# Patient Record
Sex: FEMALE | Race: WHITE | NOT HISPANIC OR LATINO | Employment: FULL TIME | URBAN - METROPOLITAN AREA
[De-identification: names, ages, dates, MRNs, and addresses within clinical notes are randomized per-mention and may not be internally consistent; named-entity substitution may affect disease eponyms.]

---

## 2022-10-22 ENCOUNTER — HOSPITAL ENCOUNTER (EMERGENCY)
Facility: HOSPITAL | Age: 52
Discharge: HOME | End: 2022-10-22
Attending: EMERGENCY MEDICINE
Payer: COMMERCIAL

## 2022-10-22 VITALS
BODY MASS INDEX: 38.06 KG/M2 | TEMPERATURE: 98.1 F | RESPIRATION RATE: 18 BRPM | HEIGHT: 69 IN | OXYGEN SATURATION: 98 % | HEART RATE: 70 BPM | DIASTOLIC BLOOD PRESSURE: 96 MMHG | WEIGHT: 257 LBS | SYSTOLIC BLOOD PRESSURE: 192 MMHG

## 2022-10-22 DIAGNOSIS — I10 HYPERTENSION: Primary | ICD-10-CM

## 2022-10-22 LAB
ALBUMIN SERPL BCP-MCNC: 4.3 G/DL (ref 3.5–5)
ALP SERPL-CCNC: 80 U/L (ref 34–104)
ALT SERPL W P-5'-P-CCNC: 15 U/L (ref 7–52)
ANION GAP SERPL CALCULATED.3IONS-SCNC: 5 MMOL/L (ref 4–13)
AST SERPL W P-5'-P-CCNC: 17 U/L (ref 13–39)
BASOPHILS # BLD AUTO: 0.08 THOUSANDS/ÂΜL (ref 0–0.1)
BASOPHILS NFR BLD AUTO: 2 % (ref 0–1)
BILIRUB SERPL-MCNC: 0.92 MG/DL (ref 0.2–1)
BILIRUB UR QL STRIP: NEGATIVE
BUN SERPL-MCNC: 13 MG/DL (ref 5–25)
CALCIUM SERPL-MCNC: 9.2 MG/DL (ref 8.4–10.2)
CHLORIDE SERPL-SCNC: 101 MMOL/L (ref 96–108)
CLARITY UR: CLEAR
CO2 SERPL-SCNC: 32 MMOL/L (ref 21–32)
COLOR UR: COLORLESS
CREAT SERPL-MCNC: 0.74 MG/DL (ref 0.6–1.3)
EOSINOPHIL # BLD AUTO: 0.11 THOUSAND/ÂΜL (ref 0–0.61)
EOSINOPHIL NFR BLD AUTO: 2 % (ref 0–6)
ERYTHROCYTE [DISTWIDTH] IN BLOOD BY AUTOMATED COUNT: 13 % (ref 11.6–15.1)
GFR SERPL CREATININE-BSD FRML MDRD: 92 ML/MIN/1.73SQ M
GLUCOSE SERPL-MCNC: 90 MG/DL (ref 65–140)
GLUCOSE UR STRIP-MCNC: NEGATIVE MG/DL
HCT VFR BLD AUTO: 45.3 % (ref 34.8–46.1)
HGB BLD-MCNC: 14.4 G/DL (ref 11.5–15.4)
HGB UR QL STRIP.AUTO: NEGATIVE
IMM GRANULOCYTES # BLD AUTO: 0.01 THOUSAND/UL (ref 0–0.2)
IMM GRANULOCYTES NFR BLD AUTO: 0 % (ref 0–2)
KETONES UR STRIP-MCNC: ABNORMAL MG/DL
LEUKOCYTE ESTERASE UR QL STRIP: NEGATIVE
LYMPHOCYTES # BLD AUTO: 1.08 THOUSANDS/ÂΜL (ref 0.6–4.47)
LYMPHOCYTES NFR BLD AUTO: 24 % (ref 14–44)
MCH RBC QN AUTO: 27.2 PG (ref 26.8–34.3)
MCHC RBC AUTO-ENTMCNC: 31.8 G/DL (ref 31.4–37.4)
MCV RBC AUTO: 86 FL (ref 82–98)
MONOCYTES # BLD AUTO: 0.37 THOUSAND/ÂΜL (ref 0.17–1.22)
MONOCYTES NFR BLD AUTO: 8 % (ref 4–12)
NEUTROPHILS # BLD AUTO: 2.93 THOUSANDS/ÂΜL (ref 1.85–7.62)
NEUTS SEG NFR BLD AUTO: 64 % (ref 43–75)
NITRITE UR QL STRIP: NEGATIVE
NRBC BLD AUTO-RTO: 0 /100 WBCS
PH UR STRIP.AUTO: 7 [PH]
PLATELET # BLD AUTO: 200 THOUSANDS/UL (ref 149–390)
PMV BLD AUTO: 11.5 FL (ref 8.9–12.7)
POTASSIUM SERPL-SCNC: 3.4 MMOL/L (ref 3.5–5.3)
PROT SERPL-MCNC: 7.3 G/DL (ref 6.4–8.4)
PROT UR STRIP-MCNC: NEGATIVE MG/DL
RBC # BLD AUTO: 5.3 MILLION/UL (ref 3.81–5.12)
SODIUM SERPL-SCNC: 138 MMOL/L (ref 135–147)
SP GR UR STRIP.AUTO: 1.01 (ref 1–1.03)
UROBILINOGEN UR STRIP-ACNC: <2 MG/DL
WBC # BLD AUTO: 4.58 THOUSAND/UL (ref 4.31–10.16)

## 2022-10-22 PROCEDURE — 36415 COLL VENOUS BLD VENIPUNCTURE: CPT

## 2022-10-22 PROCEDURE — 93005 ELECTROCARDIOGRAM TRACING: CPT

## 2022-10-22 PROCEDURE — 99285 EMERGENCY DEPT VISIT HI MDM: CPT | Performed by: EMERGENCY MEDICINE

## 2022-10-22 PROCEDURE — 80053 COMPREHEN METABOLIC PANEL: CPT

## 2022-10-22 PROCEDURE — 81003 URINALYSIS AUTO W/O SCOPE: CPT

## 2022-10-22 PROCEDURE — 85025 COMPLETE CBC W/AUTO DIFF WBC: CPT

## 2022-10-22 RX ORDER — AMLODIPINE BESYLATE 5 MG/1
5 TABLET ORAL ONCE
Status: COMPLETED | OUTPATIENT
Start: 2022-10-22 | End: 2022-10-22

## 2022-10-22 RX ORDER — AMLODIPINE BESYLATE 5 MG/1
5 TABLET ORAL DAILY
Qty: 30 TABLET | Refills: 0 | Status: SHIPPED | OUTPATIENT
Start: 2022-10-22 | End: 2022-11-21

## 2022-10-22 RX ADMIN — AMLODIPINE BESYLATE 5 MG: 5 TABLET ORAL at 12:25

## 2022-10-22 NOTE — DISCHARGE INSTRUCTIONS
Please take amlodipine 1 tablet per month for 30 days  Call [facility] family Medicine group if you do not hear from them to schedule an appointment  Return sooner to the ED if you experience severe headache, dizziness or or worsening symptoms

## 2022-10-22 NOTE — ED ATTENDING ATTESTATION
10/22/2022  IPrincess Marty MD, saw and evaluated the patient  I have discussed the patient with the resident/non-physician practitioner and agree with the resident's/non-physician practitioner's findings, Plan of Care, and MDM as documented in the resident's/non-physician practitioner's note, except where noted  All available labs and Radiology studies were reviewed  I was present for key portions of any procedure(s) performed by the resident/non-physician practitioner and I was immediately available to provide assistance  At this point I agree with the current assessment done in the Emergency Department  I have conducted an independent evaluation of this patient a history and physical is as follows:    53-year-old female presenting for evaluation of asymptomatic hypertension  Patient was previously on an unknown blood pressure medication but no longer needed after a gastric bypass surgery in 2007  She was incidentally found to have blood pressure elevated to the high 318U systolic at a doctor's appointments several months ago so bought a home blood pressure cuff  States that she has continued to have blood pressures as high as 194-633 systolic at home for the last couple of months  Today she happened to see her blood pressure cuff when opening a drawer so decided to take her blood pressure again and found that her systolic blood pressure was 200  Patient denies any headache, vision changes, chest pain, difficulty breathing, or decreased urine output  She does admit to being under lot of stress at work  Physical Exam  Vitals and nursing note reviewed  Constitutional:       General: She is not in acute distress  Appearance: She is well-developed  She is not diaphoretic  HENT:      Head: Normocephalic and atraumatic  Right Ear: External ear normal       Left Ear: External ear normal       Nose: Nose normal    Eyes:      Extraocular Movements: Extraocular movements intact  Conjunctiva/sclera: Conjunctivae normal    Cardiovascular:      Rate and Rhythm: Normal rate and regular rhythm  Pulses: Normal pulses  Heart sounds: Normal heart sounds  No murmur heard  No friction rub  No gallop  Pulmonary:      Effort: Pulmonary effort is normal  No respiratory distress  Breath sounds: Normal breath sounds  No wheezing or rales  Abdominal:      General: Bowel sounds are normal  There is no distension  Palpations: Abdomen is soft  Tenderness: There is no abdominal tenderness  There is no guarding  Musculoskeletal:         General: No deformity  Normal range of motion  Cervical back: Normal range of motion and neck supple  Right lower leg: No edema  Left lower leg: No edema  Skin:     General: Skin is warm and dry  Neurological:      General: No focal deficit present  Mental Status: She is alert and oriented to person, place, and time  Motor: No abnormal muscle tone  Comments: Normal strength, normal gait, normal balance, normal speech   Psychiatric:         Mood and Affect: Mood normal            ED Course  ED Course as of 10/22/22 1706   Sat Oct 22, 2022   1151 I personally interpreted the patient's EKG which reveals normal rate, normal sinus rhythm, normal axis, normal intervals, no ischemic changes  Patient denies chest pain, no shortness of breath, no indication for troponins  CBC and CMP are unremarkable  Blood pressure is elevated here in the emergency department  Patient states that she took her blood pressure this morning simply because she happened to see her blood pressure cuff rather than for any particular symptoms  She does note that she has been sporadically taking her blood pressure since August and has noticed elevated pressures as high as the 082 systolic at home  No symptoms that would be consistent with end-organ damage    Will start on low-dose amlodipine, 5 mg, and referred to [facility] for follow-up  Patient counseled to take her blood pressure daily at the same time each day and keep a log so that blood pressure medication can be readjusted at her appointment  Of note, pt reported a mild HA in triage and to the resident physician but denied this to me  Her neuro exam was normal as above  No indication for head CT at this time       Critical Care Time  Procedures

## 2022-10-22 NOTE — ED PROVIDER NOTES
History  Chief Complaint   Patient presents with   • Hypertension     Reports checking bp @ home, SPB 200s- reports mild headache, no blurred vision, dizziness, unsteady gait chest pain, not on any antihypertensives @ home      Patient is a 29-year-old female with no significant past medical history who came into the ED complaining of  increased blood pressure  Patient stated that she took her blood pressure at home today which showed a systolic blood pressure  in the 200's  [facility] her [facility] as she was concerned    She reports that the reading at the [facility] was also high and still in the 200's  She denies  vision changes, dizziness, unsteady gait, shortness of breath or chest pain,  but reports mild headache  Of note, patient stated that back in 2005 she was put on antihypertensive medication but  after having a bypass surgery in 2007 she was taken off her antihypertensive medication and has not taken any meds since  Patient reports she has been under lot of stress at work lately, reason why she decided to take her blood pressure  Patient is seen at bedside and appear to be in no acute distress, she stated that she came to the ED because she wanted to get checked out as she was worried about  her blood pressure read this morning  None       No past medical history on file  No past surgical history on file  No family history on file  I have reviewed and agree with the history as documented  No existing history information found  No existing history information found  Review of Systems   Constitutional: Negative for chills, diaphoresis, fatigue and fever  HENT: Negative for ear pain and sore throat  Eyes: Negative for photophobia, pain and visual disturbance  Respiratory: Negative for cough and shortness of breath  Cardiovascular: Negative for chest pain, palpitations and leg swelling  Gastrointestinal: Negative for abdominal pain, nausea and vomiting  Genitourinary: Negative for dyspareunia, dysuria and hematuria  Musculoskeletal: Negative for arthralgias and back pain  Skin: Negative for color change and rash  Neurological: Positive for headaches  Negative for dizziness, seizures, syncope, facial asymmetry, speech difficulty, weakness, light-headedness and numbness  All other systems reviewed and are negative  Physical Exam  ED Triage Vitals   Temperature Pulse Respirations Blood Pressure SpO2   10/22/22 1005 10/22/22 1005 10/22/22 1005 10/22/22 1000 10/22/22 1005   98 1 °F (36 7 °C) 72 18 (!) 213/96 99 %      Temp Source Heart Rate Source Patient Position - Orthostatic VS BP Location FiO2 (%)   10/22/22 1005 10/22/22 1005 10/22/22 1000 10/22/22 1000 --   Oral Monitor Sitting Left arm       Pain Score       10/22/22 1005       1             Orthostatic Vital Signs  Vitals:    10/22/22 1000 10/22/22 1005 10/22/22 1228 10/22/22 1229   BP: (!) 213/96 (!) 177/71 (!) 192/96 (!) 192/96   Pulse:  72 70    Patient Position - Orthostatic VS: Sitting Sitting Sitting        Physical Exam  Vitals and nursing note reviewed  Constitutional:       General: She is not in acute distress  Appearance: She is well-developed  HENT:      Head: Normocephalic and atraumatic  Eyes:      Conjunctiva/sclera: Conjunctivae normal    Cardiovascular:      Rate and Rhythm: Normal rate and regular rhythm  Heart sounds: No murmur heard  Pulmonary:      Effort: Pulmonary effort is normal  No respiratory distress  Breath sounds: Normal breath sounds  Abdominal:      General: There is no distension  Palpations: Abdomen is soft  Tenderness: There is no abdominal tenderness  Musculoskeletal:      Cervical back: Neck supple  Skin:     General: Skin is warm and dry  Neurological:      Mental Status: She is alert           ED Medications  Medications   amLODIPine (NORVASC) tablet 5 mg (5 mg Oral Given 10/22/22 1225)       Diagnostic Studies  Results Reviewed     Procedure Component Value Units Date/Time    UA (URINE) with reflex to Scope [[id number]]  (Abnormal) Collected: 10/22/22 1228    Lab Status: Final result Specimen: Urine, Clean Catch Updated: 10/22/22 1237     Color, UA Colorless     Clarity, UA Clear     Specific Gravity, UA 1 010     pH, UA 7 0     Leukocytes, UA Negative     Nitrite, UA Negative     Protein, UA Negative mg/dl      Glucose, UA Negative mg/dl      Ketones, UA Trace mg/dl      Urobilinogen, UA <2 0 mg/dl      Bilirubin, UA Negative     Occult Blood, UA Negative    Comprehensive metabolic panel [[id number]]  (Abnormal) Collected: 10/22/22 1116    Lab Status: Final result Specimen: Blood from Arm, Left Updated: 10/22/22 1137     Sodium 138 mmol/L      Potassium 3 4 mmol/L      Chloride 101 mmol/L      CO2 32 mmol/L      ANION GAP 5 mmol/L      BUN 13 mg/dL      Creatinine 0 74 mg/dL      Glucose 90 mg/dL      Calcium 9 2 mg/dL      AST 17 U/L      ALT 15 U/L      Alkaline Phosphatase 80 U/L      Total Protein 7 3 g/dL      Albumin 4 3 g/dL      Total Bilirubin 0 92 mg/dL      eGFR 92 ml/min/1 73sq m     Narrative:      [place] guidelines for Chronic Kidney Disease (CKD):   •  Stage 1 with normal or high GFR (GFR > 90 mL/min/1 73 square meters)  •  Stage 2 Mild CKD (GFR = 60-89 mL/min/1 73 square meters)  •  Stage 3A Moderate CKD (GFR = 45-59 mL/min/1 73 square meters)  •  Stage 3B Moderate CKD (GFR = 30-44 mL/min/1 73 square meters)  •  Stage 4 Severe CKD (GFR = 15-29 mL/min/1 73 square meters)  •  Stage 5 End Stage CKD (GFR <15 mL/min/1 73 square meters)  Note: GFR calculation is accurate only with a steady state creatinine    CBC and differential [[id number]]  (Abnormal) Collected: 10/22/22 1116    Lab Status: Final result Specimen: Blood from Arm, Left Updated: 10/22/22 1121     WBC 4 58 Thousand/uL      RBC 5 30 Million/uL      Hemoglobin 14 4 g/dL      Hematocrit 45 3 %      MCV 86 fL      MCH 27 2 pg      MCHC 31 8 g/dL      RDW 13 0 %      MPV 11 5 fL      Platelets 606 Thousands/uL      nRBC 0 /100 WBCs      Neutrophils Relative 64 %      Immat GRANS % 0 %      Lymphocytes Relative 24 %      Monocytes Relative 8 %      Eosinophils Relative 2 %      Basophils Relative 2 %      Neutrophils Absolute 2 93 Thousands/µL      Immature Grans Absolute 0 01 Thousand/uL      Lymphocytes Absolute 1 08 Thousands/µL      Monocytes Absolute 0 37 Thousand/µL      Eosinophils Absolute 0 11 Thousand/µL      Basophils Absolute 0 08 Thousands/µL                  No orders to display         Procedures  ECG 12 Lead Documentation Only    Date/Time: 10/22/2022 12:34 PM  Performed by: Rafaela Deluna MD  Authorized by: Rafaela Deluna MD     Indications / Diagnosis:  Hypertension  ECG reviewed by me, the ED Provider: yes    Patient location:  ED  Previous ECG:     Previous ECG:  Unavailable    Comparison to cardiac monitor: Yes    Interpretation:     Interpretation: normal    Rate:     ECG rate:  66BPM    ECG rate assessment: normal    Rhythm:     Rhythm: sinus rhythm    Ectopy:     Ectopy: none    QRS:     QRS axis:  Normal  Conduction:     Conduction: normal    ST segments:     ST segments:  Normal  T waves:     T waves: normal    Comments:      Ventricular rate 66 beats per minute, WA interval 164ms QRS duration 88ms QT 440ms QTC 461ms  No acute ST segment elevation or depression, normal EKG  ED Course                                       MDM  Number of Diagnoses or Management Options  Hypertension  Diagnosis management comments: Patient presented with the complaint of increased blood pressure recorded at home with systolic blood pressure in the 200  Denies any sign of chest pain, vision problem, abdominal pain, unsteady gait weakness  Patient labs only remarkable for potassium of 3 4        There is no evidence of acute end organ damage seen based on patient labs  Patient was given amlodipine in the ED as well as a prescription of amlodipine with a referral to a PCP in a week for management of hypertension  Disposition  Final diagnoses:   Hypertension     Time reflects when diagnosis was documented in both MDM as applicable and the Disposition within this note     Time User Action Codes Description Comment    10/22/2022 12:50 PM Ravindra Silva [place] Hypertension       ED Disposition     ED Disposition   Discharge    Condition   Stable    Date/Time   Sat Oct 22, 2022 12:59 PM    Comment   Claudine Tobias discharge to home/self care  Follow-up Information     Follow up With Specialties Details Why Contact Info Additional Information    [address]  Family Medicine Call   [address] [facility] [address]  [address] [facility] , [facility] [address] [facility] [address], [address] [phone]          Discharge Medication List as of 10/22/2022  1:13 PM      START taking these medications    Details   amLODIPine (NORVASC) 5 mg tablet Take 1 tablet (5 mg total) by mouth daily, Starting Sat 10/22/2022, Until Mon 11/21/2022, Normal               PDMP Review     None           ED Provider  Attending physically available and evaluated Claudine Tobias I managed the patient along with the ED Attending      Electronically Signed by         Joeseph Halsted, MD  10/22/22 3915

## 2022-10-23 LAB
ATRIAL RATE: 66 BPM
P AXIS: 53 DEGREES
PR INTERVAL: 164 MS
QRS AXIS: 1 DEGREES
QRSD INTERVAL: 88 MS
QT INTERVAL: 440 MS
QTC INTERVAL: 461 MS
T WAVE AXIS: 28 DEGREES
VENTRICULAR RATE: 66 BPM

## 2022-10-23 PROCEDURE — 93010 ELECTROCARDIOGRAM REPORT: CPT | Performed by: INTERNAL MEDICINE

## 2022-12-14 ENCOUNTER — RA CDI HCC (OUTPATIENT)
Dept: OTHER | Facility: HOSPITAL | Age: 52
End: 2022-12-14

## 2022-12-14 NOTE — PROGRESS NOTES
[facility]       Chart reviewed, no opportunity found: CHART REVIEWED, NO OPPORTUNITY FOUND        Patients Insurance        Commercial Insurance: [address] [place] [address]

## 2022-12-20 RX ORDER — TURMERIC ROOT EXTRACT 500 MG
TABLET ORAL DAILY
COMMUNITY

## 2022-12-20 RX ORDER — LISINOPRIL 10 MG/1
10 TABLET ORAL DAILY
COMMUNITY
Start: 2022-11-04

## 2022-12-20 RX ORDER — COLLAGEN, HYDROLYSATE (BOVINE) 100 %
POWDER (GRAM) MISCELLANEOUS DAILY
COMMUNITY

## 2022-12-21 ENCOUNTER — OFFICE VISIT (OUTPATIENT)
Dept: FAMILY MEDICINE CLINIC | Facility: CLINIC | Age: 52
End: 2022-12-21

## 2022-12-21 VITALS
HEART RATE: 72 BPM | RESPIRATION RATE: 18 BRPM | SYSTOLIC BLOOD PRESSURE: 120 MMHG | OXYGEN SATURATION: 99 % | BODY MASS INDEX: 38.48 KG/M2 | HEIGHT: 69 IN | DIASTOLIC BLOOD PRESSURE: 84 MMHG | WEIGHT: 259.8 LBS

## 2022-12-21 DIAGNOSIS — B97.7 HPV (HUMAN PAPILLOMA VIRUS) INFECTION: ICD-10-CM

## 2022-12-21 DIAGNOSIS — Z00.00 ANNUAL PHYSICAL EXAM: ICD-10-CM

## 2022-12-21 DIAGNOSIS — Z98.890 S/P GASTRIC SURGERY: ICD-10-CM

## 2022-12-21 DIAGNOSIS — D50.9 IRON DEFICIENCY ANEMIA, UNSPECIFIED IRON DEFICIENCY ANEMIA TYPE: ICD-10-CM

## 2022-12-21 DIAGNOSIS — Z12.11 COLON CANCER SCREENING: ICD-10-CM

## 2022-12-21 DIAGNOSIS — Z13.29 SCREENING FOR THYROID DISORDER: ICD-10-CM

## 2022-12-21 DIAGNOSIS — Z12.4 CERVICAL CANCER SCREENING: ICD-10-CM

## 2022-12-21 DIAGNOSIS — D24.2 INTRADUCTAL PAPILLOMA OF BREAST, LEFT: ICD-10-CM

## 2022-12-21 DIAGNOSIS — Z76.89 ENCOUNTER TO ESTABLISH CARE: Primary | ICD-10-CM

## 2022-12-21 DIAGNOSIS — Z13.220 SCREENING CHOLESTEROL LEVEL: ICD-10-CM

## 2022-12-21 DIAGNOSIS — Z12.31 ENCOUNTER FOR SCREENING MAMMOGRAM FOR MALIGNANT NEOPLASM OF BREAST: ICD-10-CM

## 2022-12-21 DIAGNOSIS — I10 ESSENTIAL HYPERTENSION: ICD-10-CM

## 2022-12-21 PROBLEM — Z98.84 HISTORY OF GASTRIC BYPASS: Status: ACTIVE | Noted: 2021-06-29

## 2022-12-21 PROBLEM — Z86.018 HISTORY OF MENINGIOMA: Status: ACTIVE | Noted: 2021-06-29

## 2022-12-21 PROBLEM — Z83.3 FAMILY HISTORY OF DIABETES MELLITUS: Status: ACTIVE | Noted: 2021-06-29

## 2022-12-21 PROBLEM — Z90.49 HISTORY OF CHOLECYSTECTOMY: Status: ACTIVE | Noted: 2021-06-29

## 2022-12-21 RX ORDER — AMLODIPINE BESYLATE 10 MG/1
10 TABLET ORAL DAILY
COMMUNITY

## 2022-12-21 NOTE — PROGRESS NOTES
Assessment/Plan:   Diagnoses and all orders for this visit:    Encounter to establish care  Annual physical exam  - reviewed PMHx, PSHx, meds, allergies, FHx, Soc Hx, Sexual Hx  - UTD with Tdap (2021)   - UTD with COVID IMMs but no Booster - advised to schedule   - UTD with annual Flu vaccine   - overdue for annual GYN exam, Cervical Ca screening and Breast Ca screening - referral and script given   - due for Colon Ca screening - options d/w pt and pt interested in screening Cscope - referred to GI   - declined STD screening in the office today  - due for labs - script given   - discussed diet and exercise  - UTD with Optho and [address] [place] [address] in 1yr for annual exam or sooner if with abnml labs - pt aware and agreeable     BMI 38 0-38 9,adult  -     Ambulatory Referral to Weight Management; Future  -     Ambulatory Referral to Nutrition Services; Future  - BMI 38 4  - s/p gastric bypass in 2007   - has been doing Weight Watchers  - referral given to [facility]ist [facility]   S/P gastric surgery    Colon cancer screening  -     Ambulatory Referral to Gastroenterology; Future    Cervical cancer screening  -     Ambulatory Referral to Obstetrics / Gynecology; Future  - (+) h/o intraductal papilloma of L-breast and HPV  - strongly advised to schedule and establish with local Ob/Gyn - pt aware and agreeable   HPV (human papilloma virus) infection  Intraductal papilloma of breast, left  Encounter for screening mammogram for malignant neoplasm of breast  -     Mammo screening bilateral w 3d & cad; Future    Essential hypertension  -     Microalbumin / creatinine urine ratio  -     Basic metabolic panel;  Future  - BP stable in the office today   - currently on CCB 10mg QD and ACEI 10mg QD   - will check BMP and urine microalbumin   - UTD with COVID IMMs but no Booster   - UTD with annual Flu vaccine  - UTD with annual Optho - [facility] - got new glasses   - advised to get screening labs and RTO in 1-2wks with home BP cuff for BP check - pt aware and agreeable     Screening cholesterol level  -     Lipid panel; Future    Screening for thyroid disorder  -     TSH, 3rd generation with Free T4 reflex    Iron deficiency anemia, unspecified iron deficiency anemia type  -     Iron Panel (Includes Ferritin, Iron Sat%, Iron, and TIBC); Future    Other orders  -     lisinopril (ZESTRIL) 10 mg tablet; Take 10 mg by mouth daily  -     Collagen Hydrolysate POWD; Use daily  -     Multiple Vitamins-Minerals (One-A-Day Womens) tablet; Take by mouth daily  -     Turmeric 500 MG TABS; Take by mouth daily  -     Zinc 50 MG CAPS; Take 50 mg by mouth daily  -     amLODIPine (NORVASC) 10 mg tablet; Take 10 mg by mouth daily          Subjective:    Patient ID: Nicole Acuña is a 47 y o  female    Nicole Acuña is a 47 y o  female who presents to the office to establish care and for an annual exam  - prior PCP: [place] in [place]   - PMHx: HTN, Anemia, Intraductal papilloma of L-breast, HPV  - allergies: NKDA  - Meds: CCB 10mg QD, ACEI 10mg QD, Fish Oil   - PSHx: Meningioma s/p Craniotomy (8/2007), Gastric Bypass (7/2007), [place] (1/2008)   - FHx: M (HTN, HL, DM, CAD), F (HTN, HL, DM, Prostate Ca, kidney stones), S (HTN, kidney stones), B (DM, kidney stones, obese), MGM (Breast Ca), denies FHx of Colon Ca  - Immunizations: UTD with Tdap (2021), UTD with COVID IMMs, UTD with annual Flu vaccine   - GYN Hx: s/p Menopause, last OV was 10/2020, HPV POS, unsure of last Mammo   - Hm: has never had a Cscope   - diet/exercise: walks dogs, diet: Weight Watchers   - social: denies tob/illicits, social EtOH, works from home - works for [employer]   - sexual Hx: active with partner, declined STD screening   - last vision: wear glasses, [facility] - goes annually - got new glasses   - last dental: goes Q6months   - ROS: today in the office pt denies F/C/N/V/HA/visual changes/CP/palpitations/SOB/wheezing/abd pain/D/LE edema       The following portions of the patient's history were reviewed and updated as appropriate: allergies, current medications, past family history, past medical history, past social history, past surgical history and problem list     Review of Systems  as per HPI    Objective:  /84 (BP Location: Left arm, Patient Position: Sitting, Cuff Size: Large)   Pulse 72   Resp 18   Ht 5' 9" (1 753 m)   Wt 118 kg (259 lb 12 8 oz)   SpO2 99%   BMI 38 37 kg/m²    Physical Exam  Vitals reviewed  Constitutional:       General: She is not in acute distress  Appearance: Normal appearance  She is not ill-appearing, toxic-appearing or diaphoretic  HENT:      Head: Normocephalic and atraumatic  Right Ear: External ear normal       Left Ear: External ear normal    Eyes:      General: No scleral icterus  Right eye: No discharge  Left eye: No discharge  Extraocular Movements: Extraocular movements intact  Conjunctiva/sclera: Conjunctivae normal    Cardiovascular:      Rate and Rhythm: Normal rate and regular rhythm  Heart sounds: Normal heart sounds  Pulmonary:      Effort: Pulmonary effort is normal  No respiratory distress  Breath sounds: Normal breath sounds  No stridor  No wheezing, rhonchi or rales  Abdominal:      Palpations: Abdomen is soft  Musculoskeletal:         General: Normal range of motion  Cervical back: Normal range of motion  Right lower leg: No edema  Left lower leg: No edema  Skin:     General: Skin is warm  Neurological:      General: No focal deficit present  Mental Status: She is alert and oriented to person, place, and time  Psychiatric:         Mood and Affect: Mood normal          Behavior: Behavior normal          BMI Counseling: Body mass index is 38 37 kg/m²  The BMI is above normal  Nutrition recommendations include 3-5 servings of fruits/vegetables daily  Exercise recommendations include exercising 3-5 times per week   Patient referred to nutritionist due to patient being obese  Patient referred to weight management due to patient being obese  Depression Screening Follow-up Plan: Patient's depression screening was positive with a PHQ-2 score of 0  Their PHQ-9 score was   Clinically patient does not have depression  No treatment is required

## 2022-12-22 NOTE — PROGRESS NOTES
Assessment/Plan:   Diagnoses and all orders for this visit:    Encounter to establish care  BMI 38 0-38 9,adult  -     Ambulatory Referral to Weight Management; Future  -     Ambulatory Referral to Nutrition Services; Future  - BMI 38 4  - s/p gastric bypass in 2007   - has been doing Weight Watchers  - referral given to [facility]   S/P gastric surgery    Cervical cancer screening  -     Ambulatory Referral to Obstetrics / Gynecology; Future  - (+) h/o intraductal papilloma of L-breast and HPV  - strongly advised to schedule and establish with local Ob/Gyn - pt aware and agreeable   HPV (human papilloma virus) infection  Intraductal papilloma of breast, left  Encounter for screening mammogram for malignant neoplasm of breast  -     Mammo screening bilateral w 3d & cad; Future    Essential hypertension  -     Microalbumin / creatinine urine ratio  -     Basic metabolic panel; Future  - BP stable in the office today   - currently on CCB 10mg QD and ACEI 10mg QD   - will check BMP and urine microalbumin   - UTD with COVID IMMs but no Booster   - UTD with annual Flu vaccine  - UTD with annual Optho - [facility] - got new glasses   - advised to get screening labs and RTO in 1-2wks with home BP cuff for BP check - pt aware and agreeable     Iron deficiency anemia, unspecified iron deficiency anemia type  -     Iron Panel (Includes Ferritin, Iron Sat%, Iron, and TIBC); Future    Other orders  -     lisinopril (ZESTRIL) 10 mg tablet; Take 10 mg by mouth daily  -     Collagen Hydrolysate POWD; Use daily  -     Multiple Vitamins-Minerals (One-A-Day Womens) tablet; Take by mouth daily  -     Turmeric 500 MG TABS; Take by mouth daily  -     Zinc 50 MG CAPS; Take 50 mg by mouth daily  -     amLODIPine (NORVASC) 10 mg tablet; Take 10 mg by mouth daily          Subjective:    Patient ID: Angelika Lund is a 47 y o  female    Angelika Lund is a 47 y o  female who presents to the office to establish care and for an annual exam  - prior PCP: PA in [place]   - PMHx: HTN, Anemia, Intraductal papilloma of L-breast, HPV  - allergies: NKDA  - Meds: CCB 10mg QD, ACEI 10mg QD, Fish Oil   - PSHx: Meningioma s/p Craniotomy (8/2007), Gastric Bypass (7/2007), [place] (1/2008)   - FHx: M (HTN, HL, DM, CAD), F (HTN, HL, DM, Prostate Ca, kidney stones), S (HTN, kidney stones), B (DM, kidney stones, obese), MGM (Breast Ca), denies FHx of Colon Ca  - Immunizations: UTD with Tdap (2021), UTD with COVID IMMs, UTD with annual Flu vaccine   - GYN Hx: s/p Menopause, last OV was 10/2020, HPV POS, unsure of last Mammo   - Hm: has never had a Cscope   - diet/exercise: walks dogs, diet: Weight Watchers   - social: denies tob/illicits, social EtOH, works from home - works for [employer]   - sexual Hx: active with partner, declined STD screening   - last vision: wear glasses, [facility] - goes annually - got new glasses   - last dental: goes Q6months   - ROS: today in the office pt denies F/C/N/V/HA/visual changes/CP/palpitations/SOB/wheezing/abd pain/D/LE edema       The following portions of the patient's history were reviewed and updated as appropriate: allergies, current medications, past family history, past medical history, past social history, past surgical history and problem list     Review of Systems  as per HPI    Objective:  /84 (BP Location: Left arm, Patient Position: Sitting, Cuff Size: Large)   Pulse 72   Resp 18   Ht 5' 9" (1 753 m)   Wt 118 kg (259 lb 12 8 oz)   SpO2 99%   BMI 38 37 kg/m²    Physical Exam  Vitals reviewed  Constitutional:       General: She is not in acute distress  Appearance: Normal appearance  She is not ill-appearing, toxic-appearing or diaphoretic  HENT:      Head: Normocephalic and atraumatic  Right Ear: External ear normal       Left Ear: External ear normal    Eyes:      General: No scleral icterus  Right eye: No discharge  Left eye: No discharge  Extraocular Movements: Extraocular movements intact  Conjunctiva/sclera: Conjunctivae normal    Cardiovascular:      Rate and Rhythm: Normal rate and regular rhythm  Heart sounds: Normal heart sounds  Pulmonary:      Effort: Pulmonary effort is normal  No respiratory distress  Breath sounds: Normal breath sounds  No stridor  No wheezing, rhonchi or rales  Abdominal:      Palpations: Abdomen is soft  Musculoskeletal:         General: Normal range of motion  Cervical back: Normal range of motion  Right lower leg: No edema  Left lower leg: No edema  Skin:     General: Skin is warm  Neurological:      General: No focal deficit present  Mental Status: She is alert and oriented to person, place, and time  Psychiatric:         Mood and Affect: Mood normal          Behavior: Behavior normal          BMI Counseling: Body mass index is 38 37 kg/m²  The BMI is above normal  Nutrition recommendations include 3-5 servings of fruits/vegetables daily  Exercise recommendations include exercising 3-5 times per week  Patient referred to nutritionist due to patient being obese  Patient referred to weight management due to patient being obese  Depression Screening Follow-up Plan: Patient's depression screening was positive with a PHQ-2 score of 0  Their PHQ-9 score was   Clinically patient does not have depression  No treatment is required

## 2022-12-22 NOTE — PATIENT INSTRUCTIONS

## 2022-12-25 LAB
ALBUMIN/CREAT UR: 3 MCG/MG CREAT
BUN SERPL-MCNC: 20 MG/DL (ref 7–25)
BUN/CREAT SERPL: NORMAL (CALC) (ref 6–22)
CALCIUM SERPL-MCNC: 9.1 MG/DL (ref 8.6–10.4)
CHLORIDE SERPL-SCNC: 109 MMOL/L (ref 98–110)
CHOLEST SERPL-MCNC: 178 MG/DL
CHOLEST/HDLC SERPL: 2.7 (CALC)
CO2 SERPL-SCNC: 29 MMOL/L (ref 20–32)
CREAT SERPL-MCNC: 0.72 MG/DL (ref 0.5–1.03)
CREAT UR-MCNC: 156 MG/DL (ref 20–275)
FERRITIN SERPL-MCNC: 58 NG/ML (ref 16–232)
GFR/BSA.PRED SERPLBLD CYS-BASED-ARV: 100 ML/MIN/1.73M2
GLUCOSE SERPL-MCNC: 84 MG/DL (ref 65–99)
HDLC SERPL-MCNC: 67 MG/DL
IRON SATN MFR SERPL: 32 % (CALC) (ref 16–45)
IRON SERPL-MCNC: 94 MCG/DL (ref 45–160)
LDLC SERPL CALC-MCNC: 99 MG/DL (CALC)
MICROALBUMIN UR-MCNC: 0.4 MG/DL
NONHDLC SERPL-MCNC: 111 MG/DL (CALC)
POTASSIUM SERPL-SCNC: 4.2 MMOL/L (ref 3.5–5.3)
SODIUM SERPL-SCNC: 144 MMOL/L (ref 135–146)
TIBC SERPL-MCNC: 296 MCG/DL (CALC) (ref 250–450)
TRIGL SERPL-MCNC: 41 MG/DL
TSH SERPL-ACNC: 1.6 MIU/L

## 2023-01-10 ENCOUNTER — RA CDI HCC (OUTPATIENT)
Dept: OTHER | Facility: HOSPITAL | Age: 53
End: 2023-01-10

## 2023-01-10 NOTE — PROGRESS NOTES
[facility]  [facility] opportunities       Chart reviewed, no opportunity found: CHART REVIEWED, NO OPPORTUNITY FOUND        Patients Insurance        Commercial Insurance: [address] [place] [address]

## 2023-01-18 ENCOUNTER — OFFICE VISIT (OUTPATIENT)
Dept: FAMILY MEDICINE CLINIC | Facility: CLINIC | Age: 53
End: 2023-01-18

## 2023-01-18 VITALS
SYSTOLIC BLOOD PRESSURE: 132 MMHG | WEIGHT: 258 LBS | RESPIRATION RATE: 16 BRPM | HEART RATE: 73 BPM | DIASTOLIC BLOOD PRESSURE: 80 MMHG | OXYGEN SATURATION: 97 % | BODY MASS INDEX: 38.1 KG/M2

## 2023-01-18 DIAGNOSIS — I10 ESSENTIAL HYPERTENSION: Primary | ICD-10-CM

## 2023-01-18 DIAGNOSIS — Z12.11 COLON CANCER SCREENING: ICD-10-CM

## 2023-01-18 DIAGNOSIS — Z80.3 FAMILY HISTORY OF BREAST CANCER IN SISTER: ICD-10-CM

## 2023-01-18 DIAGNOSIS — D24.2 INTRADUCTAL PAPILLOMA OF BREAST, LEFT: ICD-10-CM

## 2023-01-18 DIAGNOSIS — Z98.890 S/P GASTRIC SURGERY: ICD-10-CM

## 2023-01-18 DIAGNOSIS — R09.81 HEAD CONGESTION: ICD-10-CM

## 2023-01-18 DIAGNOSIS — B97.7 HPV (HUMAN PAPILLOMA VIRUS) INFECTION: ICD-10-CM

## 2023-01-18 NOTE — PROGRESS NOTES
Assessment/Plan:   Diagnoses and all orders for this visit:    Essential hypertension  - BP in the office 132/88 and on my repeat 132/80 and 132/84  - per pt's cuff 136/94, HR 78  - nml renal function and urine microalbumin   - currently on CCB 10mg QD and ACEI 10mg QD and tolerating both well - cont for now  - UTD with COVID IMMs but no Booster - advised to schedule   - UTD with annual Flu vaccine  - UTD with annual Optho - [facility]   - RTO in 6months for f/u - pt aware and agreeable   - The 10-year ASCVD risk score (Cortney TAYLOR, et al , 2019) is: 1 7%    Values used to calculate the score:      Age: 47 years      Sex: Female      Is Non- : No      Diabetic: No      Tobacco smoker: No      Systolic Blood Pressure: 365 mmHg      Is BP treated: Yes      HDL Cholesterol: 67 mg/dL      Total Cholesterol: 178 mg/dL    BMI 38 0-38 9,adult  S/P gastric surgery  - s/p gastric bypass in 2007   - has been doing Weight Watchers  - has referrals for [facility]     Head congestion  - advised OTC Mucinex     HPV (human papilloma virus) infection  Intraductal papilloma of breast, left  Family history of breast cancer in sister  - (+) h/o intraductal papilloma of L-breast and HPV  - strongly advised to schedule and establish with local Ob/Gyn - pt aware and agreeable   - has Mammo scheduled for 4/1/2023    Colon cancer screening  -     Ambulatory Referral to Gastroenterology; Future          Subjective:    Patient ID: Lucy Douglas is a 47 y o  female    HPI   49yo F presents to the office for f/u  - reviewed labs done 12/24/2022  - has Mammo scheduled for 4/1/2023  - has referral to GI for screening Cscope - needs Sat appt   - BP in the office 132/88 and on my repeat 132/80 and 132/84  - per pt's cuff 136/94, HR 78  - currently on CCB 10mg QD and ACEI 10mg QD and tolerating both well   - had a migraine with aura which lasted 2wks and then had a "head cold" - (+) congestion  - did miss antihypertensives 1-2x over the past few days   - had been taking Motrin/Tylenol around the clock   - of note, sister was recently d/w Breast Ca       The following portions of the patient's history were reviewed and updated as appropriate: allergies, current medications, past family history, past medical history, past social history, past surgical history and problem list     Review of Systems  as per HPI    Objective:  /80 (BP Location: Left arm, Patient Position: Sitting, Cuff Size: Large)   Pulse 73   Resp 16   Wt 117 kg (258 lb)   SpO2 97%   BMI 38 10 kg/m²    Physical Exam  Vitals reviewed  Constitutional:       General: She is not in acute distress  Appearance: Normal appearance  She is not ill-appearing, toxic-appearing or diaphoretic  HENT:      Head: Normocephalic and atraumatic  Right Ear: External ear normal       Left Ear: External ear normal       Nose: Nose normal    Eyes:      General: No scleral icterus  Right eye: No discharge  Left eye: No discharge  Extraocular Movements: Extraocular movements intact  Conjunctiva/sclera: Conjunctivae normal    Cardiovascular:      Rate and Rhythm: Normal rate and regular rhythm  Heart sounds: Normal heart sounds  Pulmonary:      Effort: Pulmonary effort is normal  No respiratory distress  Breath sounds: Normal breath sounds  No stridor  No wheezing, rhonchi or rales  Abdominal:      Palpations: Abdomen is soft  Musculoskeletal:         General: Normal range of motion  Cervical back: Normal range of motion  Right lower leg: No edema  Left lower leg: No edema  Skin:     General: Skin is warm  Neurological:      General: No focal deficit present  Mental Status: She is alert and oriented to person, place, and time  Psychiatric:         Mood and Affect: Mood normal          Behavior: Behavior normal            BMI Counseling: Body mass index is 38 1 kg/m²   The BMI is above normal  Nutrition recommendations include 3-5 servings of fruits/vegetables daily  Exercise recommendations include exercising 3-5 times per week

## 2023-03-06 ENCOUNTER — OFFICE VISIT (OUTPATIENT)
Dept: FAMILY MEDICINE CLINIC | Facility: CLINIC | Age: 53
End: 2023-03-06

## 2023-03-06 VITALS
SYSTOLIC BLOOD PRESSURE: 120 MMHG | HEART RATE: 90 BPM | OXYGEN SATURATION: 99 % | RESPIRATION RATE: 16 BRPM | TEMPERATURE: 98.6 F | DIASTOLIC BLOOD PRESSURE: 70 MMHG | BODY MASS INDEX: 40.14 KG/M2 | WEIGHT: 271 LBS | HEIGHT: 69 IN

## 2023-03-06 DIAGNOSIS — J06.9 VIRAL UPPER RESPIRATORY TRACT INFECTION: Primary | ICD-10-CM

## 2023-03-06 NOTE — PROGRESS NOTES
Name: Matthew Gibson      : 1970      MRN: [id number]  Encounter Provider: Estrellita Bruno MD  Encounter Date: 3/6/2023   Encounter department: [facility]     1  Viral upper respiratory tract infection  Assessment & Plan:  Symptomatic care, and home COVID test is negative she is given excuse to return to work  She is afebrile             Subjective     She says since yesterday she was feeling some scratchy throat sneezing and nasal congestion, and denies any fever, she has mild cough, she had a negative COVID test, she had some family member was having symptoms, she is advised from work that she needs a note to return to work    Review of Systems   Constitutional: Negative  HENT: Positive for congestion, sneezing and sore throat  Eyes: Negative  Respiratory: Positive for cough  Gastrointestinal: Negative          Past Medical History:   Diagnosis Date   • Anemia    • Hypertension    • Obesity      Past Surgical History:   Procedure Laterality Date   • BRAIN SURGERY     • CHOLECYSTECTOMY       Family History   Problem Relation Age of Onset   • Hypertension Mother    • Hyperlipidemia Mother    • Diabetes Mother    • Coronary artery disease Mother    • Hyperlipidemia Father    • Hypertension Father    • Diabetes Father    • Prostate cancer Father 79   • Nephrolithiasis Father    • Hypertension Sister    • Nephrolithiasis Sister    • Breast cancer Sister    • Nephrolithiasis Sister    • Diabetes Brother    • Nephrolithiasis Brother    • Obesity Brother    • Breast cancer Maternal Grandmother    • Colon cancer Neg Hx      Social History     Socioeconomic History   • Marital status: Legally      Spouse name: None   • Number of children: None   • Years of education: None   • Highest education level: None   Occupational History   • None   Tobacco Use   • Smoking status: Never   • Smokeless tobacco: Never   Vaping Use   • Vaping Use: Never used Substance and Sexual Activity   • Alcohol use: Yes     Comment: social   • Drug use: Never   • Sexual activity: Yes     Partners: Male     Comment: declined STD screening in the office today   Other Topics Concern   • None   Social History Narrative   • None     Social Determinants of Health     Financial Resource Strain: Not on file   Food Insecurity: Not on file   Transportation Needs: Not on file   Physical Activity: Not on file   Stress: Not on file   Social Connections: Not on file   Intimate Partner Violence: Not on file   Housing Stability: Not on file     Current Outpatient Medications on File Prior to Visit   Medication Sig   • amLODIPine (NORVASC) 10 mg tablet Take 10 mg by mouth daily   • Collagen Hydrolysate POWD Use daily   • lisinopril (ZESTRIL) 10 mg tablet Take 10 mg by mouth daily   • Multiple Vitamins-Minerals (One-A-Day Womens) tablet Take by mouth daily   • Turmeric 500 MG TABS Take by mouth daily   • Zinc 50 MG CAPS Take 50 mg by mouth daily   • amLODIPine (NORVASC) 5 mg tablet Take 1 tablet (5 mg total) by mouth daily (Patient taking differently: Take 10 mg by mouth daily)     No Known Allergies  Immunization History   Administered Date(s) Administered   • COVID-19 PFIZER VACCINE 0 3 ML IM 03/12/2021, 04/07/2021   • INFLUENZA 10/28/2022   • Tdap 06/29/2021       Objective     /70   Pulse 90   Temp 98 6 °F (37 °C)   Resp 16   Ht 5' 9" (1 753 m)   Wt 123 kg (271 lb)   SpO2 99%   BMI 40 02 kg/m²     Physical Exam  Vitals and nursing note reviewed  Constitutional:       Appearance: She is well-developed  She is not ill-appearing  HENT:      Head: Normocephalic and atraumatic  Eyes:      General: No scleral icterus  Extraocular Movements: Extraocular movements intact  Conjunctiva/sclera: Conjunctivae normal    Neck:      Thyroid: No thyromegaly  Cardiovascular:      Rate and Rhythm: Normal rate and regular rhythm  Heart sounds: Normal heart sounds   No murmur heard   Pulmonary:      Effort: Pulmonary effort is normal       Breath sounds: Normal breath sounds  No wheezing or rales  Musculoskeletal:      Cervical back: Normal range of motion and neck supple  Lymphadenopathy:      Cervical: No cervical adenopathy  Skin:     Findings: No erythema or rash  Neurological:      Mental Status: She is alert         Jaydon Valente MD

## 2023-03-06 NOTE — ASSESSMENT & PLAN NOTE
Symptomatic care, and home COVID test is negative she is given excuse to return to work  She is afebrile

## 2023-05-05 PROBLEM — J06.9 VIRAL UPPER RESPIRATORY TRACT INFECTION: Status: RESOLVED | Noted: 2023-03-06 | Resolved: 2023-05-05

## 2023-06-02 ENCOUNTER — VBI (OUTPATIENT)
Dept: ADMINISTRATIVE | Facility: OTHER | Age: 53
End: 2023-06-02

## 2023-06-12 ENCOUNTER — TELEPHONE (OUTPATIENT)
Dept: OTHER | Facility: OTHER | Age: 53
End: 2023-06-12

## 2023-06-12 NOTE — TELEPHONE ENCOUNTER
PT's  called in requesting a call back to schedule a colonoscopy for PT, who has a referral from her PCP  Adjacent Tissue Transfer Text: The defect edges were debeveled with a #15 scalpel blade.  Given the location of the defect and the proximity to free margins an adjacent tissue transfer was deemed most appropriate.  Using a sterile surgical marker, an appropriate flap was drawn incorporating the defect and placing the expected incisions within the relaxed skin tension lines where possible.    The area thus outlined was incised deep to adipose tissue with a #15 scalpel blade.  The skin margins were undermined to an appropriate distance in all directions utilizing iris scissors.

## 2023-06-13 NOTE — TELEPHONE ENCOUNTER
Pts  called in to schedule for a colonoscopy  Passed the OA screening but requested to be scheduled on a Saturday with Dr Jethro Sparks

## 2023-06-14 ENCOUNTER — PREP FOR PROCEDURE (OUTPATIENT)
Dept: GASTROENTEROLOGY | Facility: CLINIC | Age: 53
End: 2023-06-14

## 2023-06-14 DIAGNOSIS — Z12.11 SCREENING FOR COLON CANCER: Primary | ICD-10-CM

## 2023-06-14 NOTE — TELEPHONE ENCOUNTER
Called Leonila Lowers patients spouse and scheduled - reviewed and My charted prep Instructions  Scheduled date of colonoscopy (as of today):8/12/23  Physician performing colonoscopy:Conchita  Location of colonoscopy:[facility]  Bowel prep reviewed with patient:Dulco/Miralax  Instructions reviewed with patient by:Xavi mendes  Clearances:  none

## 2023-07-13 ENCOUNTER — RA CDI HCC (OUTPATIENT)
Dept: OTHER | Facility: HOSPITAL | Age: 53
End: 2023-07-13

## 2023-07-13 NOTE — PROGRESS NOTES
[address] coding opportunities       Chart Reviewed number of suggestions sent to Provider: 1     Patients Insurance     Commercial Insurance: Bridger Saleem       E66.01: Morbid (severe) obesity due to excess calories ([address])     Per CMS/ICD 10 coding guidelines, to be used when BMI >=40

## 2023-07-19 ENCOUNTER — OFFICE VISIT (OUTPATIENT)
Dept: FAMILY MEDICINE CLINIC | Facility: CLINIC | Age: 53
End: 2023-07-19
Payer: COMMERCIAL

## 2023-07-19 VITALS
BODY MASS INDEX: 41.47 KG/M2 | RESPIRATION RATE: 16 BRPM | HEART RATE: 66 BPM | DIASTOLIC BLOOD PRESSURE: 82 MMHG | SYSTOLIC BLOOD PRESSURE: 136 MMHG | OXYGEN SATURATION: 98 % | HEIGHT: 69 IN | WEIGHT: 280 LBS

## 2023-07-19 DIAGNOSIS — Z98.890 S/P GASTRIC SURGERY: ICD-10-CM

## 2023-07-19 DIAGNOSIS — Z12.31 ENCOUNTER FOR SCREENING MAMMOGRAM FOR MALIGNANT NEOPLASM OF BREAST: ICD-10-CM

## 2023-07-19 DIAGNOSIS — D50.9 IRON DEFICIENCY ANEMIA, UNSPECIFIED IRON DEFICIENCY ANEMIA TYPE: ICD-10-CM

## 2023-07-19 DIAGNOSIS — Z13.29 SCREENING FOR THYROID DISORDER: ICD-10-CM

## 2023-07-19 DIAGNOSIS — Z13.220 SCREENING CHOLESTEROL LEVEL: ICD-10-CM

## 2023-07-19 DIAGNOSIS — Z80.3 FAMILY HISTORY OF BREAST CANCER IN SISTER: ICD-10-CM

## 2023-07-19 DIAGNOSIS — B97.7 HPV (HUMAN PAPILLOMA VIRUS) INFECTION: ICD-10-CM

## 2023-07-19 DIAGNOSIS — I10 ESSENTIAL HYPERTENSION: ICD-10-CM

## 2023-07-19 DIAGNOSIS — Z13.1 SCREENING FOR DIABETES MELLITUS: ICD-10-CM

## 2023-07-19 DIAGNOSIS — Z12.11 COLON CANCER SCREENING: ICD-10-CM

## 2023-07-19 DIAGNOSIS — R09.82 PND (POST-NASAL DRIP): Primary | ICD-10-CM

## 2023-07-19 DIAGNOSIS — D24.2 INTRADUCTAL PAPILLOMA OF BREAST, LEFT: ICD-10-CM

## 2023-07-19 PROCEDURE — 99214 OFFICE O/P EST MOD 30 MIN: CPT | Performed by: FAMILY MEDICINE

## 2023-07-19 RX ORDER — AMLODIPINE BESYLATE 10 MG/1
10 TABLET ORAL DAILY
Qty: 90 TABLET | Refills: 1 | Status: SHIPPED | OUTPATIENT
Start: 2023-07-19

## 2023-07-19 RX ORDER — FLUTICASONE PROPIONATE 50 MCG
1 SPRAY, SUSPENSION (ML) NASAL DAILY
Qty: 11.1 ML | Refills: 2 | Status: SHIPPED | OUTPATIENT
Start: 2023-07-19

## 2023-07-19 RX ORDER — LISINOPRIL 10 MG/1
10 TABLET ORAL DAILY
Qty: 90 TABLET | Refills: 1 | Status: SHIPPED | OUTPATIENT
Start: 2023-07-19

## 2023-07-19 NOTE — PROGRESS NOTES
Assessment/Plan:   Diagnoses and all orders for this visit:    PND (post-nasal drip)  -     fluticasone (FLONASE) 50 mcg/act nasal spray; 1 spray into each nostril daily  - advised OTC Mucinex (w/o "D"), Flonase, Cool Mist Humidifier in the room   - f/u PRN     Essential hypertension  -     lisinopril (ZESTRIL) 10 mg tablet; Take 1 tablet (10 mg total) by mouth daily  -     amLODIPine (NORVASC) 10 mg tablet; Take 1 tablet (10 mg total) by mouth daily  -     Albumin / creatinine urine ratio; Future  -     Comprehensive metabolic panel; Future  - BP stable in office   - currently on CCB 10mg QD and ACEI 10mg QD and tolerating both well - cont for now  - UTD with COVID IMMs but no Booster - advised to schedule   - UTD with annual Optho - [facility]   - diet/exercise - Mediterranean Diet   - caution with NSAIDs  - limit Na to 2g/day   - RTO in 5months, with labs, for f/u and annual exam - pt aware and agreeable     S/P gastric surgery  BMI 40.0-44.9, adult ([address])  - BMI 41  - s/p gastric bypass in 2007   - has been doing Weight Watchers  - has referrals for [facility]   Screening for diabetes mellitus  -     HEMOGLOBIN A1C W/ EAG ESTIMATION; Future    HPV (human papilloma virus) infection  Intraductal papilloma of breast, left  Encounter for screening mammogram for malignant neoplasm of breast  Family history of breast cancer in sister  - UTD with annual Mammo (4/12/2023) - annual screening     Colon cancer screening  - has screening Cscope scheduled for 8/12/2023     Screening cholesterol level  -     Lipid panel; Future    Screening for thyroid disorder  -     TSH, 3rd generation with Free T4 reflex; Future    Iron deficiency anemia, unspecified iron deficiency anemia type  -     CBC and differential; Future  -     Iron Panel (Includes Ferritin, Iron Sat%, Iron, and TIBC); Future          Subjective:    Patient ID: Patricia Felix is a 46 y.o. female.   HPI  49yo F presents to the office for f/u  - (+) PND   - BP in the office 136/82   - currently on CCB 10mg QD and ACEI 10mg QD and tolerating both well   - UTD with annual Mammo (4/12/2023) - annual screening   - of note, sister was recently d/w Breast Ca at age 49yo   - has annual GYN exam scheduled for 8/4/2023  - has screening Cscope scheduled for 8/12/2023       The following portions of the patient's history were reviewed and updated as appropriate: allergies, current medications, past family history, past medical history, past social history, past surgical history and problem list.    Review of Systems  as per HPI    Objective:  /82 (BP Location: Left arm, Patient Position: Sitting, Cuff Size: Large)   Pulse 66   Resp 16   Ht 5' 9" (1.753 m)   Wt 127 kg (280 lb)   SpO2 98%   BMI 41.35 kg/m²    Physical Exam  Vitals reviewed. Constitutional:       General: She is not in acute distress. Appearance: Normal appearance. She is not ill-appearing, toxic-appearing or diaphoretic. HENT:      Head: Normocephalic and atraumatic. Right Ear: External ear normal.      Left Ear: External ear normal.      Nose: Congestion present. Eyes:      General: No scleral icterus. Right eye: No discharge. Left eye: No discharge. Extraocular Movements: Extraocular movements intact. Conjunctiva/sclera: Conjunctivae normal.   Cardiovascular:      Rate and Rhythm: Normal rate and regular rhythm. Heart sounds: Normal heart sounds. Pulmonary:      Effort: Pulmonary effort is normal. No respiratory distress. Breath sounds: Normal breath sounds. No stridor. No wheezing or rales. Abdominal:      Palpations: Abdomen is soft. Musculoskeletal:         General: Normal range of motion. Cervical back: Normal range of motion. Right lower leg: No edema. Left lower leg: No edema. Skin:     General: Skin is warm. Neurological:      General: No focal deficit present.       Mental Status: She is alert and oriented to person, place, and time. Psychiatric:         Mood and Affect: Mood normal.         Behavior: Behavior normal.         BMI Counseling: Body mass index is 41.35 kg/m². The BMI is above normal. Nutrition recommendations include 3-5 servings of fruits/vegetables daily. Exercise recommendations include exercising 3-5 times per week.

## 2023-08-12 ENCOUNTER — HOSPITAL ENCOUNTER (OUTPATIENT)
Dept: GASTROENTEROLOGY | Facility: HOSPITAL | Age: 53
Setting detail: OUTPATIENT SURGERY
Discharge: HOME | End: 2023-08-12
Attending: INTERNAL MEDICINE
Payer: COMMERCIAL

## 2023-08-12 ENCOUNTER — ANESTHESIA EVENT (OUTPATIENT)
Dept: GASTROENTEROLOGY | Facility: HOSPITAL | Age: 53
End: 2023-08-12

## 2023-08-12 ENCOUNTER — ANESTHESIA (OUTPATIENT)
Dept: GASTROENTEROLOGY | Facility: HOSPITAL | Age: 53
End: 2023-08-12

## 2023-08-12 VITALS
OXYGEN SATURATION: 99 % | HEIGHT: 69 IN | HEART RATE: 59 BPM | WEIGHT: 276.9 LBS | DIASTOLIC BLOOD PRESSURE: 60 MMHG | TEMPERATURE: 97.4 F | BODY MASS INDEX: 41.01 KG/M2 | RESPIRATION RATE: 18 BRPM | SYSTOLIC BLOOD PRESSURE: 114 MMHG

## 2023-08-12 DIAGNOSIS — Z12.11 SCREENING FOR COLON CANCER: ICD-10-CM

## 2023-08-12 PROCEDURE — G0121 COLON CA SCRN NOT HI RSK IND: HCPCS | Performed by: INTERNAL MEDICINE

## 2023-08-12 RX ORDER — LIDOCAINE HYDROCHLORIDE 20 MG/ML
INJECTION, SOLUTION EPIDURAL; INFILTRATION; INTRACAUDAL; PERINEURAL AS NEEDED
Status: DISCONTINUED | OUTPATIENT
Start: 2023-08-12 | End: 2023-08-12

## 2023-08-12 RX ORDER — PROPOFOL 10 MG/ML
INJECTION, EMULSION INTRAVENOUS AS NEEDED
Status: DISCONTINUED | OUTPATIENT
Start: 2023-08-12 | End: 2023-08-12

## 2023-08-12 RX ORDER — SODIUM CHLORIDE, SODIUM LACTATE, POTASSIUM CHLORIDE, CALCIUM CHLORIDE 600; 310; 30; 20 MG/100ML; MG/100ML; MG/100ML; MG/100ML
INJECTION, SOLUTION INTRAVENOUS CONTINUOUS PRN
Status: DISCONTINUED | OUTPATIENT
Start: 2023-08-12 | End: 2023-08-12

## 2023-08-12 RX ADMIN — PROPOFOL 50 MG: 10 INJECTION, EMULSION INTRAVENOUS at 09:29

## 2023-08-12 RX ADMIN — PROPOFOL 50 MG: 10 INJECTION, EMULSION INTRAVENOUS at 09:35

## 2023-08-12 RX ADMIN — SODIUM CHLORIDE, SODIUM LACTATE, POTASSIUM CHLORIDE, AND CALCIUM CHLORIDE: .6; .31; .03; .02 INJECTION, SOLUTION INTRAVENOUS at 09:21

## 2023-08-12 RX ADMIN — PROPOFOL 50 MG: 10 INJECTION, EMULSION INTRAVENOUS at 09:38

## 2023-08-12 RX ADMIN — PROPOFOL 100 MG: 10 INJECTION, EMULSION INTRAVENOUS at 09:26

## 2023-08-12 RX ADMIN — PROPOFOL 50 MG: 10 INJECTION, EMULSION INTRAVENOUS at 09:33

## 2023-08-12 RX ADMIN — PROPOFOL 50 MG: 10 INJECTION, EMULSION INTRAVENOUS at 09:41

## 2023-08-12 RX ADMIN — PROPOFOL 50 MG: 10 INJECTION, EMULSION INTRAVENOUS at 09:32

## 2023-08-12 RX ADMIN — LIDOCAINE HYDROCHLORIDE 100 MG: 20 INJECTION, SOLUTION EPIDURAL; INFILTRATION; INTRACAUDAL; PERINEURAL at 09:26

## 2023-08-12 RX ADMIN — PROPOFOL 50 MG: 10 INJECTION, EMULSION INTRAVENOUS at 09:44

## 2023-08-12 NOTE — ANESTHESIA PREPROCEDURE EVALUATION
Procedure:  COLONOSCOPY    Relevant Problems   CARDIO   (+) Hypertension goal BP (blood pressure) < 140/90      HEMATOLOGY   (+) Anemia, iron deficiency      Other   (+) BMI 40.0-44.9, adult (HCC)   (+) History of cholecystectomy   (+) History of gastric bypass   (+) S/P gastric surgery        Physical Exam    Airway    Mallampati score: II  TM Distance: >3 FB  Neck ROM: full     Dental   No notable dental hx     Cardiovascular  Cardiovascular exam normal    Pulmonary  Pulmonary exam normal     Other Findings        Anesthesia Plan  ASA Score- 3     Anesthesia Type- IV sedation with anesthesia with ASA Monitors. Additional Monitors:   Airway Plan:           Plan Factors-Exercise tolerance (METS): >4 METS. Chart reviewed. EKG reviewed. Imaging results reviewed. Existing labs reviewed. Patient summary reviewed. Patient is not a current smoker. Induction- intravenous. Postoperative Plan-     Informed Consent- Anesthetic plan and risks discussed with patient. I personally reviewed this patient with the CRNA. Discussed and agreed on the Anesthesia Plan with the CRNA. Dami Saldivar

## 2023-08-12 NOTE — H&P
History and Physical -  [facility]  Stefano Fabry 46 y.o. female MRN: [id number]      HPI: Stefano Fabry is a 46y.o. year old female who presents for screening colonoscopy      REVIEW OF SYSTEMS: Per the HPI, and otherwise unremarkable. Historical Information   Past Medical History:   Diagnosis Date   • Anemia    • Hypertension    • Obesity      Past Surgical History:   Procedure Laterality Date   • BRAIN SURGERY     • BREAST BIOPSY Left     3/ 2022  intraductal papilloma   • CHOLECYSTECTOMY       Social History   Social History     Substance and Sexual Activity   Alcohol Use Yes    Comment: social     Social History     Substance and Sexual Activity   Drug Use Never     Social History     Tobacco Use   Smoking Status Never   Smokeless Tobacco Never     Family History   Problem Relation Age of Onset   • Hypertension Mother    • Hyperlipidemia Mother    • Diabetes Mother    • Coronary artery disease Mother    • Hyperlipidemia Father    • Hypertension Father    • Diabetes Father    • Prostate cancer Father 79   • Nephrolithiasis Father    • Hypertension Sister    • Nephrolithiasis Sister    • Breast cancer Sister 54   • Nephrolithiasis Sister    • Breast cancer Maternal Grandmother 72   • Diabetes Brother    • Nephrolithiasis Brother    • Obesity Brother    • Colon cancer Neg Hx        Meds/Allergies     (Not in a hospital admission)      No Known Allergies    Objective     Blood pressure 151/80, pulse 75, temperature 97.8 °F (36.6 °C), temperature source Temporal, resp. rate 18, height 5' 9" (1.753 m), weight 126 kg (276 lb 14.4 oz), SpO2 98 %. PHYSICAL EXAM    Gen: NAD  CV: RRR  CHEST: Clear  ABD: soft, NT/ND  EXT: no edema      ASSESSMENT/PLAN:  This is a 46y.o. year old female here for colonoscopy, and she is stable and optimized for her procedure.

## 2023-08-12 NOTE — ANESTHESIA POSTPROCEDURE EVALUATION
Post-Op Assessment Note    CV Status:  Stable  Pain Score: 0    Pain management: adequate     Mental Status:  Alert and awake   Hydration Status:  Euvolemic   PONV Controlled:  Controlled   Airway Patency:  Patent      Post Op Vitals Reviewed: Yes      Staff: Anesthesiologist, CRNA         There were no known notable events for this encounter.     /60 (08/12/23 0951)    Temp      Pulse 64 (08/12/23 0951)   Resp 18 (08/12/23 0951)    SpO2 95 % (08/12/23 0951)

## 2023-09-25 ENCOUNTER — OFFICE VISIT (OUTPATIENT)
Dept: OBGYN CLINIC | Facility: CLINIC | Age: 53
End: 2023-09-25
Payer: COMMERCIAL

## 2023-09-25 VITALS
SYSTOLIC BLOOD PRESSURE: 132 MMHG | WEIGHT: 286 LBS | BODY MASS INDEX: 42.36 KG/M2 | DIASTOLIC BLOOD PRESSURE: 90 MMHG | HEIGHT: 69 IN

## 2023-09-25 DIAGNOSIS — Z01.419 ENCOUNTER FOR GYNECOLOGICAL EXAMINATION WITHOUT ABNORMAL FINDING: Primary | ICD-10-CM

## 2023-09-25 DIAGNOSIS — Z11.51 SCREENING FOR HUMAN PAPILLOMAVIRUS (HPV): ICD-10-CM

## 2023-09-25 DIAGNOSIS — Z80.3 FAMILY HISTORY OF BREAST CANCER: ICD-10-CM

## 2023-09-25 PROCEDURE — S0610 ANNUAL GYNECOLOGICAL EXAMINA: HCPCS | Performed by: OBSTETRICS & GYNECOLOGY

## 2023-09-25 PROCEDURE — G0145 SCR C/V CYTO,THINLAYER,RESCR: HCPCS | Performed by: OBSTETRICS & GYNECOLOGY

## 2023-09-25 PROCEDURE — G0476 HPV COMBO ASSAY CA SCREEN: HCPCS | Performed by: OBSTETRICS & GYNECOLOGY

## 2023-09-25 NOTE — PROGRESS NOTES
Wyatt Rodriguez is a 46 y.o. female who presents for annual well woman exam.   \    History of abnormal Pap smear: yes - hpv   Family history of uterine or ovarian cancer: no    Family history of breast cancer: yes - sister, MGM     Menstrual History:  OB History        2    Para   2    Term   2            AB        Living   2       SAB        IAB        Ectopic        Multiple        Live Births   2                  No LMP recorded. Patient is postmenopausal.       The following portions of the patient's history were reviewed and updated as appropriate: allergies, current medications, past family history, past medical history, past social history, past surgical history and problem list.    Review of Systems  Review of Systems   Constitutional: Negative for activity change, appetite change, chills, fatigue and fever. Respiratory: Negative for apnea, cough, chest tightness and shortness of breath. Cardiovascular: Negative for chest pain, palpitations and leg swelling. Gastrointestinal: Negative for abdominal pain, constipation, diarrhea, nausea and vomiting. Genitourinary: Negative for difficulty urinating, dysuria, flank pain, frequency, hematuria and urgency. Neurological: Negative for dizziness, seizures, syncope, light-headedness, numbness and headaches. Psychiatric/Behavioral: Negative for agitation and confusion.           Objective      /90 (BP Location: Left arm, Patient Position: Sitting, Cuff Size: Adult)   Ht 5' 9" (1.753 m)   Wt 130 kg (286 lb)   BMI 42.23 kg/m²     Physical Exam  OBGyn Exam     General:   alert and oriented, in no acute distress, alert, appears stated age and cooperative   Heart: regular rate and rhythm, S1, S2 normal, no murmur, click, rub or gallop   Lungs: clear to auscultation bilaterally   Abdomen: soft, non-tender, without masses or organomegaly   Vulva: normal   Vagina: normal mucosa, normal discharge   Cervix: no cervical motion tenderness and no lesions   Uterus: normal size   Adnexa:  Breast Exam:  normal adnexa  breasts appear normal, no suspicious masses, no skin or nipple changes or axillary nodes. Assessment      @well woman@ . 27-year-old female  Annual exam  Post menopause  Fibroid uterus  Family history of breast cancer  Prior2 vaginal delivery    Plan   Pap/HPV  Diet/exercise  Calcium/vitamin D  Genetic oncology referral secondary to family history of breast cancer  Mammogram up-to-date  Colonoscopy up-to-date  Return to office for annual exam   All questions answered. There are no Patient Instructions on file for this visit.

## 2023-09-26 LAB
HPV HR 12 DNA CVX QL NAA+PROBE: POSITIVE
HPV16 DNA CVX QL NAA+PROBE: NEGATIVE
HPV18 DNA CVX QL NAA+PROBE: NEGATIVE

## 2023-09-27 ENCOUNTER — TELEPHONE (OUTPATIENT)
Dept: HEMATOLOGY ONCOLOGY | Facility: CLINIC | Age: 53
End: 2023-09-27

## 2023-09-27 NOTE — TELEPHONE ENCOUNTER
I spoke with Ezra Rowan to schedule their consultation with Cancer Risk and Genetics. Scheduling Outcome: Patient is scheduled for an appointment on 03/13/2024 at 9:30AM with Martine Houston     Personal/Family History Related to Appointment:     Personal History of Cancer: Patient reports no personal history of cancer    Family History of Cancer: Patient reports family history of Mother- multiple myeloma, [place] Grandmother- breast ca, [place] sister- breast ca, Father- prostate ca. Is patient of [address] [place] [address],  [address]?: No    History of Genetic Testing:  Personal History of Genetic Testing: Patient report no personal history of Genetic Testing. Family History of Genetic Testing: Patient reports that no family members have had Genetic Testing. Progeny:  Is patient able to complete our family history questionnaire on a computer?:  Yes    Patient's preferred e-mail address: [email or web address]

## 2023-09-29 LAB
LAB AP GYN PRIMARY INTERPRETATION: NORMAL
Lab: NORMAL

## 2023-10-11 ENCOUNTER — VBI (OUTPATIENT)
Dept: ADMINISTRATIVE | Facility: OTHER | Age: 53
End: 2023-10-11

## 2023-10-12 ENCOUNTER — PROCEDURE VISIT (OUTPATIENT)
Dept: OBGYN CLINIC | Facility: CLINIC | Age: 53
End: 2023-10-12

## 2023-10-12 VITALS
BODY MASS INDEX: 41.62 KG/M2 | DIASTOLIC BLOOD PRESSURE: 84 MMHG | SYSTOLIC BLOOD PRESSURE: 118 MMHG | HEIGHT: 69 IN | WEIGHT: 281 LBS

## 2023-10-12 DIAGNOSIS — R87.810 CERVICAL HIGH RISK HPV (HUMAN PAPILLOMAVIRUS) TEST POSITIVE: Primary | ICD-10-CM

## 2023-10-12 PROCEDURE — 88305 TISSUE EXAM BY PATHOLOGIST: CPT | Performed by: STUDENT IN AN ORGANIZED HEALTH CARE EDUCATION/TRAINING PROGRAM

## 2023-10-12 PROCEDURE — 88342 IMHCHEM/IMCYTCHM 1ST ANTB: CPT | Performed by: STUDENT IN AN ORGANIZED HEALTH CARE EDUCATION/TRAINING PROGRAM

## 2023-10-12 PROCEDURE — 88344 IMHCHEM/IMCYTCHM EA MLT ANTB: CPT | Performed by: STUDENT IN AN ORGANIZED HEALTH CARE EDUCATION/TRAINING PROGRAM

## 2023-10-12 PROCEDURE — 88341 IMHCHEM/IMCYTCHM EA ADD ANTB: CPT | Performed by: STUDENT IN AN ORGANIZED HEALTH CARE EDUCATION/TRAINING PROGRAM

## 2023-10-13 NOTE — PROGRESS NOTES
Assessment/Plan:     Diagnoses and all orders for this visit:    Cervical high risk HPV (human papillomavirus) test positive  -     Tissue Exam          14-year-old female  Post menopause  Fibroid uterus  Family history of breast cancer  Prior2 vaginal delivery  Persistent HPV positive  Plan   Colposcopy ECC and biopsy at 12/7  Diet/exercise  Calcium/vitamin D  Genetic oncology referral secondary to family history of breast cancer  Mammogram up-to-date  Colonoscopy up-to-date  Return to office for annual exam   All questions answered. Subjective:      Patient ID: Sue Montaño is a 46 y.o. female. HPI  Patient seen evaluated presented office today for colposcopy secondary to HPV positive persistent procedure explained and discussed with patient all patient questions answered and patient was satisfied      The following portions of the patient's history were reviewed and updated as appropriate: allergies, current medications, past family history, past medical history, past social history, past surgical history and problem list.    Review of Systems      Objective:      /84 (BP Location: Left arm, Patient Position: Sitting, Cuff Size: Adult)   Ht 5' 9" (1.753 m)   Wt 127 kg (281 lb)   BMI 41.50 kg/m²          Physical Exam         Colposcopy     Date/Time  10/12/2023 2:15 PM     Universal Protocol   Consent: Verbal consent obtained. Risks and benefits: risks, benefits and alternatives were discussed  Consent given by: patient  Time out: Immediately prior to procedure a "time out" was called to verify the correct patient, procedure, equipment, support staff and site/side marked as required.   Patient understanding: patient states understanding of the procedure being performed  Patient consent: the patient's understanding of the procedure matches consent given  Procedure consent: procedure consent matches procedure scheduled  Relevant documents: relevant documents present and verified  Patient identity confirmed: verbally with patient     Performed by  Zena Rinaldi MD   Authorized by  Zena Rinaldi MD     Pre-procedure details      Pre-procedure timeout performed: yes      Prepped with: acetic acid     Indication    Indications: HPV positive x2. Procedure Details   Procedure: Colposcopy w/ cervical biopsy and ECC      Under satisfactory analgesia the patient was prepped and draped in the dorsal lithotomy position: yes      [place]wood speculum was placed in the vagina: yes      Under colposcopic examination the transition zone was seen in entirety: yes      Endocervix was curetted using a Kevorkian curette: yes      Cervical biopsy performed with a cervical biopsy punch: yes      Monsel's solution was applied: yes      Biopsy(s): yes      Location:  7/12    Specimen to pathology: yes     Post-procedure      Findings: White epithelium      Patient tolerance of procedure:   Tolerated well, no immediate complications

## 2023-10-16 ENCOUNTER — NURSE TRIAGE (OUTPATIENT)
Age: 53
End: 2023-10-16

## 2023-10-16 NOTE — TELEPHONE ENCOUNTER
RN triaged patient for URI symptoms and My Chart scheduled OV for Thursday 10/19 at 1 PM. Patient reports testing positive for Covid on Thursday 10/12. Symptoms are improved or resolved. Patient will fu with on 10/17 office if she will cancel OV. Reason for Disposition   [1] COVID-19 diagnosed by positive lab test (e.g., PCR, rapid self-test kit) AND [2] mild symptoms (e.g., cough, fever, others) AND [5] no complications or SOB    Answer Assessment - Initial Assessment Questions  Were you within 6 feet or less, for up to 15 minutes or more with a person that has a confirmed COVID-19 test? Unknown  What was the date of your exposure? Home test positive Thursday 10/12  Are you experiencing any symptoms attributed to the virus?  (Assess for SOB, cough, fever, difficulty breathing) Cough, sore throat, headaches  HIGH RISK: Do you have any history heart or lung conditions, weakened immune system, diabetes, Asthma, CHF, HIV, COPD, Chemo, renal failure, sickle cell, etc? Denies  PREGNANCY: Are you pregnant or did you recently give birth? Denies  VACCINE: "Have you gotten the COVID-19 vaccine?" If Yes ask: "Which one, how many shots, when did you get it?" Pfizer x 2; no boosters.     Protocols used: Coronavirus ([id number]) Diagnosed or Suspected-ADULT-[place]

## 2023-10-16 NOTE — TELEPHONE ENCOUNTER
----- Message from Adam Dunbar sent at 10/16/2023 12:13 PM EDT -----  Appointment For: Kay Dozier ([id number])  Visit Type: [facility] OFFICE VISIT [facility] ([id number])    10/19/2023   1:00 PM  20 mins.   Pooja Kent DO          [facility]    Patient Comments:  Cold,Cough,Headaches,Sore Throat,Upper respiratory conditions    Please triage patient symptoms for appropriate time frame (same day escalation)

## 2023-10-19 ENCOUNTER — OFFICE VISIT (OUTPATIENT)
Dept: FAMILY MEDICINE CLINIC | Facility: CLINIC | Age: 53
End: 2023-10-19

## 2023-10-19 VITALS
BODY MASS INDEX: 43.1 KG/M2 | OXYGEN SATURATION: 99 % | HEART RATE: 68 BPM | TEMPERATURE: 97.7 F | SYSTOLIC BLOOD PRESSURE: 130 MMHG | HEIGHT: 69 IN | DIASTOLIC BLOOD PRESSURE: 80 MMHG | WEIGHT: 291 LBS

## 2023-10-19 DIAGNOSIS — U07.1 COVID-19: Primary | ICD-10-CM

## 2023-10-19 PROCEDURE — 88305 TISSUE EXAM BY PATHOLOGIST: CPT | Performed by: STUDENT IN AN ORGANIZED HEALTH CARE EDUCATION/TRAINING PROGRAM

## 2023-10-19 PROCEDURE — 88342 IMHCHEM/IMCYTCHM 1ST ANTB: CPT | Performed by: STUDENT IN AN ORGANIZED HEALTH CARE EDUCATION/TRAINING PROGRAM

## 2023-10-19 PROCEDURE — 88341 IMHCHEM/IMCYTCHM EA ADD ANTB: CPT | Performed by: STUDENT IN AN ORGANIZED HEALTH CARE EDUCATION/TRAINING PROGRAM

## 2023-10-19 PROCEDURE — 88344 IMHCHEM/IMCYTCHM EA MLT ANTB: CPT | Performed by: STUDENT IN AN ORGANIZED HEALTH CARE EDUCATION/TRAINING PROGRAM

## 2023-10-19 NOTE — PROGRESS NOTES
Assessment/Plan:   Diagnoses and all orders for this visit:    COVID-19  - tested COVID POS on 10/12/2023   - still with s/s - advised to cont with OTC supportive care - Mucinex, Motrin, Flonase   - note given to RTW on 10/23/2023   - f/u as needed - pt aware and agreeable             Subjective:    Patient ID: Althea You is a 46 y.o. female. Cough  This is a new problem. The current episode started in the past 7 days. The problem has been gradually improving. The problem occurs every few minutes. The cough is Non-productive. Associated symptoms include a fever, headaches, nasal congestion, postnasal drip, rhinorrhea and a sore throat. Pertinent negatives include no chest pain, chills, ear congestion, ear pain, heartburn, hemoptysis, myalgias, rash, shortness of breath, sweats, weight loss or wheezing. Nothing aggravates the symptoms. - tested COVID POS on 10/12/2023   - (+) scratchy throat, cough, fatigue   - had COVID 5/2022   - UTD with COVID IMMs but Boosters   - of note, moving to [place] in 1month       The following portions of the patient's history were reviewed and updated as appropriate: allergies, current medications, past family history, past medical history, past social history, past surgical history and problem list.    Review of Systems   Constitutional:  Positive for fever. Negative for chills and weight loss. HENT:  Positive for postnasal drip, rhinorrhea and sore throat. Negative for ear pain. Respiratory:  Positive for cough. Negative for hemoptysis, shortness of breath and wheezing. Cardiovascular:  Negative for chest pain. Gastrointestinal:  Negative for heartburn. Musculoskeletal:  Negative for myalgias. Skin:  Negative for rash. Neurological:  Positive for headaches. as per HPI    Objective:  /80   Pulse 68   Temp 97.7 °F (36.5 °C)   Ht 5' 9" (1.753 m)   Wt 132 kg (291 lb)   SpO2 99%   BMI 42.97 kg/m²    Physical Exam  Vitals reviewed.    Constitutional: General: She is not in acute distress. Appearance: Normal appearance. She is not ill-appearing, toxic-appearing or diaphoretic. HENT:      Head: Normocephalic and atraumatic. Right Ear: External ear normal.      Left Ear: External ear normal.   Eyes:      General: No scleral icterus. Right eye: No discharge. Left eye: No discharge. Extraocular Movements: Extraocular movements intact. Conjunctiva/sclera: Conjunctivae normal.   Cardiovascular:      Rate and Rhythm: Normal rate and regular rhythm. Heart sounds: Normal heart sounds. Pulmonary:      Effort: Pulmonary effort is normal. No respiratory distress. Breath sounds: Normal breath sounds. No stridor. No wheezing, rhonchi or rales. Abdominal:      Palpations: Abdomen is soft. Musculoskeletal:         General: Normal range of motion. Cervical back: Normal range of motion. Skin:     General: Skin is warm. Neurological:      General: No focal deficit present. Mental Status: She is alert and oriented to person, place, and time.    Psychiatric:         Mood and Affect: Mood normal.         Behavior: Behavior normal.

## 2024-01-09 ENCOUNTER — VBI (OUTPATIENT)
Dept: ADMINISTRATIVE | Facility: OTHER | Age: 54
End: 2024-01-09

## 2024-01-30 DIAGNOSIS — I10 ESSENTIAL HYPERTENSION: ICD-10-CM

## 2024-01-31 RX ORDER — LISINOPRIL 10 MG/1
10 TABLET ORAL DAILY
Qty: 90 TABLET | Refills: 1 | Status: SHIPPED | OUTPATIENT
Start: 2024-01-31

## 2024-05-09 DIAGNOSIS — I10 ESSENTIAL HYPERTENSION: ICD-10-CM

## 2024-05-09 RX ORDER — AMLODIPINE BESYLATE 10 MG/1
10 TABLET ORAL DAILY
Qty: 90 TABLET | Refills: 1 | Status: SHIPPED | OUTPATIENT
Start: 2024-05-09